# Patient Record
Sex: FEMALE | Race: WHITE | NOT HISPANIC OR LATINO | Employment: FULL TIME | URBAN - METROPOLITAN AREA
[De-identification: names, ages, dates, MRNs, and addresses within clinical notes are randomized per-mention and may not be internally consistent; named-entity substitution may affect disease eponyms.]

---

## 2024-11-21 ENCOUNTER — OFFICE VISIT (OUTPATIENT)
Dept: OBSTETRICS AND GYNECOLOGY | Age: 44
End: 2024-11-21
Payer: COMMERCIAL

## 2024-11-21 VITALS
HEIGHT: 64 IN | SYSTOLIC BLOOD PRESSURE: 124 MMHG | DIASTOLIC BLOOD PRESSURE: 80 MMHG | BODY MASS INDEX: 31.58 KG/M2 | WEIGHT: 185 LBS

## 2024-11-21 DIAGNOSIS — K59.4 RECTAL SPASM: ICD-10-CM

## 2024-11-21 DIAGNOSIS — Z76.89 ENCOUNTER TO ESTABLISH CARE: ICD-10-CM

## 2024-11-21 DIAGNOSIS — N39.3 SUI (STRESS URINARY INCONTINENCE, FEMALE): ICD-10-CM

## 2024-11-21 DIAGNOSIS — Z80.0 FAMILY HISTORY OF COLON CANCER: ICD-10-CM

## 2024-11-21 DIAGNOSIS — Z80.3 FAMILY HISTORY OF BREAST CANCER: ICD-10-CM

## 2024-11-21 DIAGNOSIS — Z85.3 PERSONAL HISTORY OF BREAST CANCER: ICD-10-CM

## 2024-11-21 DIAGNOSIS — Z80.42 FAMILY HISTORY OF PROSTATE CANCER: ICD-10-CM

## 2024-11-21 DIAGNOSIS — N94.10 FEMALE DYSPAREUNIA: ICD-10-CM

## 2024-11-21 DIAGNOSIS — Z30.09 ENCOUNTER FOR OTHER GENERAL COUNSELING OR ADVICE ON CONTRACEPTION: Primary | ICD-10-CM

## 2024-11-21 PROBLEM — Z30.9 CONTRACEPTIVE MANAGEMENT: Status: ACTIVE | Noted: 2024-11-21

## 2024-11-21 RX ORDER — DIPHENOXYLATE HYDROCHLORIDE AND ATROPINE SULFATE 2.5; .025 MG/1; MG/1
1 TABLET ORAL
COMMUNITY

## 2024-11-21 RX ORDER — LEVOCETIRIZINE DIHYDROCHLORIDE 5 MG/1
5 TABLET, FILM COATED ORAL EVERY EVENING
COMMUNITY

## 2024-11-21 RX ORDER — OLOPATADINE HYDROCHLORIDE AND MOMETASONE FUROATE 25; 665 UG/1; UG/1
SPRAY, METERED NASAL
COMMUNITY

## 2024-11-21 RX ORDER — OMEGA-3/DHA/EPA/FISH OIL 300-1000MG
1 CAPSULE ORAL
COMMUNITY

## 2024-11-21 RX ORDER — LEVOTHYROXINE SODIUM 125 UG/1
TABLET ORAL
COMMUNITY
Start: 2024-10-24

## 2024-11-21 NOTE — ASSESSMENT & PLAN NOTE
Can address with PFPT. Also advised patient to mention to her PCP in case that provider feels she should undergo earliest colon cancer screening with these symptoms. No worrisome symptoms noted today.

## 2024-11-21 NOTE — ASSESSMENT & PLAN NOTE
Discussed vaginal dryness after menstruation particularly if heavy bleeding can be common. Can use lubrication prn for this purpose. Regarding discomfort at site of episiotomy, will be evaluated with PFPT.

## 2024-11-21 NOTE — PROGRESS NOTES
"[facility]  Obstetrics and Gynecology   New Gynecology Visit    2024    Patient: Cheri Onofre          MR#:[id number]    History of Present Illness    Chief Complaint   Patient presents with    Gynecologic Exam     New GYN, IUD consult, removal and replacement  of paraguard, last pap 3/17/22 neg, HPV neg, mammo 24 neg     44 y.o. female  who presents to establish care and for contraceptive management.    Patient with copper IUD in place for 10 years. Periods are monthly with 7-8 days of heavier bleeding which is not bothersome. Pain is manageable as well.    Patient reports that she is currently experiencing symptoms of urinary incontinence. Leakage is typically associated with activities which increase abdominal pressure. Requires daily liner with small volume leaks most days. Denies symptoms of UTI. Has never been advised on therapeutic options before.    Patient does report some symptoms of painful intercourse which are intermittent but seem to have two different etiologies. The first is related to dryness - this typically will occur only in the first couple of days after her menstrual cycle. This second is discomfort at the site of her episiotomy from her first vaginal delivery.    Patient also reports some recent history of what feels like \"rectal spasm\". This discomfort can wake her from sleep. Happens every few months but does feel it is starting to happen a bit more often. When it occurs, she feels relief with bearing down. No blood in stool. No change in stool consistency noted.    Obstetric History:  OB History          2    Para   2    Term   2            AB        Living   2         SAB        IAB        Ectopic        Molar        Multiple        Live Births   2               Menstrual History:     Patient's last menstrual period was 2024 (exact date).       Sexual History:   Monogamous sexual relationship with male    Concern for STI?: " "denies  Breast concerns: denies  Dyspareunia: denies  Fecal/gas incontinence: denies  Last Pap: 8/2022 NILM, neg HPV  Abnormal Pap hx: denies  Last mammogram: 4/2024 BIR[facility] 1  ________________________________________  There is no problem list on file for this patient.    Past Medical History:   Diagnosis Date    Allergies     Anxiety 2019    Breast cancer 2017    Cancer 2017    DCIS    Hypothyroidism 2019    PMS (premenstrual syndrome)     PONV (postoperative nausea and vomiting)     Urinary tract infection     Varicella      Past Surgical History:   Procedure Laterality Date    BREAST BIOPSY  2017    BREAST IMPLANT SURGERY Left     MASTECTOMY  2017    WISDOM TOOTH EXTRACTION  1999     Social History     Tobacco Use   Smoking Status Never    Passive exposure: Never   Smokeless Tobacco Not on file     Family History   Problem Relation Age of Onset    Hypertension Father     Breast cancer Mother 60 - 69    Stroke Paternal Grandfather     Diabetes Paternal Grandfather     Breast cancer Paternal Grandmother 70 - 79    Prostate cancer Maternal Grandfather 70 - 79    Prostate cancer Maternal Uncle 60 - 69    Prostate cancer Maternal Uncle 60 - 69    Colon cancer Paternal Uncle 60 - 69    Breast cancer Cousin 30 - 39        paternal side    Ovarian cancer Neg Hx     Uterine cancer Neg Hx     Melanoma Neg Hx     Pancreatic cancer Neg Hx      Prior to Admission medications    Not on File     ________________________________________    Review of Systems   Genitourinary:  Positive for dyspareunia. Negative for frequency, menstrual problem and urgency.          Objective     /80   Ht 162.6 cm (64\")   Wt 83.9 kg (185 lb)   LMP 11/07/2024 (Exact Date)   BMI 31.76 kg/m²    BP Readings from Last 3 Encounters:   11/21/24 124/80      Wt Readings from Last 3 Encounters:   11/21/24 83.9 kg (185 lb)        BMI: Estimated body mass index is 31.76 kg/m² as calculated from the following:    Height as of this encounter: 162.6 cm " "(64\").    Weight as of this encounter: 83.9 kg (185 lb).    Physical Exam  Vitals and nursing note reviewed.   Constitutional:       General: She is not in acute distress.     Appearance: Normal appearance.   HENT:      Head: Normocephalic and atraumatic.   Eyes:      Extraocular Movements: Extraocular movements intact.   Pulmonary:      Effort: Pulmonary effort is normal. No respiratory distress.   Abdominal:      General: There is no distension.   Skin:     General: Skin is warm and dry.   Neurological:      General: No focal deficit present.      Mental Status: She is alert.   Psychiatric:         Mood and Affect: Mood normal.         Behavior: Behavior normal.       Assessment:  Cheri Onofre is a 44 y.o.  presenting to [facility] care and for discussion of contraceptive management.    Diagnoses and all orders for this visit:    1. Encounter for other general counseling or advice on contraception (Primary)  Assessment & Plan:  Discussed paragard is effective for 12 years so not due for replacement for 2 more years.      2. KATE (stress urinary incontinence, female)  Assessment & Plan:  Patient with small volume leaks most days. Would like to pursue PFPT at this time. Ordered.    Orders:  -     Ambulatory Referral to Physical Therapy for Evaluation & Treatment    3. 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 Noted as BRCA negative but no further genetic testing mentioned in records available from [facility].    Assessment & Plan:  Given patient's personal history of breast cancer and multiple family members with breast, prostate, and colon cancer, meets criteria for further genetic testing for cancer syndromes. Offered to patient and would like to proceed. Myriad panel collected in clinic " today.      4. Family history of breast cancer    5. Family history of prostate cancer    6. Family history of colon cancer    7. Encounter to establish care    8. Rectal spasm  Assessment & Plan:  Can address with PFPT. Also advised patient to mention to her PCP in case that provider feels she should undergo earliest colon cancer screening with these symptoms. No worrisome symptoms noted today.      9. Female dyspareunia  Assessment & Plan:  Discussed vaginal dryness after menstruation particularly if heavy bleeding can be common. Can use lubrication prn for this purpose. Regarding discomfort at site of episiotomy, will be evaluated with PFPT.         Plan:  Return in about 1 year (around 11/21/2025) for WWE.    Olivia Cowart MD  11/21/2024 13:04 EST

## 2024-11-21 NOTE — ASSESSMENT & PLAN NOTE
Given patient's personal history of breast cancer and multiple family members with breast, prostate, and colon cancer, meets criteria for further genetic testing for cancer syndromes. Offered to patient and would like to proceed. Myriad panel collected in clinic today.

## 2024-11-22 ENCOUNTER — PATIENT ROUNDING (BHMG ONLY) (OUTPATIENT)
Dept: OBSTETRICS AND GYNECOLOGY | Age: 44
End: 2024-11-22
Payer: COMMERCIAL

## 2024-12-11 ENCOUNTER — TELEPHONE (OUTPATIENT)
Dept: OBSTETRICS AND GYNECOLOGY | Age: 44
End: 2024-12-11
Payer: COMMERCIAL

## 2024-12-11 NOTE — TELEPHONE ENCOUNTER
I called [facility] to check on the status of Cheri's test. The lab is working on creating an account for Dr. Cowart. Pt's insurance is covering the test. They state it will be about 10 business days before we get results.

## 2024-12-31 ENCOUNTER — TELEPHONE (OUTPATIENT)
Dept: OBSTETRICS AND GYNECOLOGY | Age: 44
End: 2024-12-31
Payer: COMMERCIAL

## 2024-12-31 NOTE — TELEPHONE ENCOUNTER
Pt's [facility] results came in. Dr. Cowart is out on maternity leave so I had Dr. Flores review. Per Dr. Flores, call pt and let her know results and also route to Dr. Cowart. I called pt and she is aware of results. Since she already had breast cancer, she is monitoring closely already. I have mailed pt a copy.